# Patient Record
Sex: FEMALE | Race: WHITE | NOT HISPANIC OR LATINO
[De-identification: names, ages, dates, MRNs, and addresses within clinical notes are randomized per-mention and may not be internally consistent; named-entity substitution may affect disease eponyms.]

---

## 2019-07-11 ENCOUNTER — APPOINTMENT (OUTPATIENT)
Dept: FAMILY MEDICINE | Facility: CLINIC | Age: 29
End: 2019-07-11
Payer: COMMERCIAL

## 2019-07-11 ENCOUNTER — RECORD ABSTRACTING (OUTPATIENT)
Age: 29
End: 2019-07-11

## 2019-07-11 VITALS
OXYGEN SATURATION: 95 % | RESPIRATION RATE: 14 BRPM | SYSTOLIC BLOOD PRESSURE: 100 MMHG | HEART RATE: 108 BPM | DIASTOLIC BLOOD PRESSURE: 60 MMHG | HEIGHT: 59 IN | BODY MASS INDEX: 18.55 KG/M2 | WEIGHT: 92 LBS

## 2019-07-11 DIAGNOSIS — Z82.3 FAMILY HISTORY OF STROKE: ICD-10-CM

## 2019-07-11 DIAGNOSIS — Z82.49 FAMILY HISTORY OF ISCHEMIC HEART DISEASE AND OTHER DISEASES OF THE CIRCULATORY SYSTEM: ICD-10-CM

## 2019-07-11 DIAGNOSIS — R55 SYNCOPE AND COLLAPSE: ICD-10-CM

## 2019-07-11 PROBLEM — Z00.00 ENCOUNTER FOR PREVENTIVE HEALTH EXAMINATION: Status: ACTIVE | Noted: 2019-07-11

## 2019-07-11 PROCEDURE — 99213 OFFICE O/P EST LOW 20 MIN: CPT

## 2019-07-12 ENCOUNTER — APPOINTMENT (OUTPATIENT)
Dept: FAMILY MEDICINE | Facility: CLINIC | Age: 29
End: 2019-07-12

## 2019-07-12 NOTE — HISTORY OF PRESENT ILLNESS
[___ Weeks ago] : [unfilled] weeks ago [FreeTextEntry8] : Pt reports pain in left axilla x 1 week. Reports "lump" that is tender to touch, she notices pain as well when she reaches her arm up. Denies numbness, tingling, swelling, redness, skin changes, injury/trauma to the area.

## 2019-07-12 NOTE — ASSESSMENT
[FreeTextEntry1] : Check US \par Motrin or tylenol prn pain\par Light stretching encouraged \par May need PT

## 2019-07-12 NOTE — PHYSICAL EXAM
[No Lymphadenopathy] : no lymphadenopathy [Supple] : supple [No Axillary Lymphadenopathy] : no axillary lymphadenopathy [Normal] : affect was normal and insight and judgment were intact [de-identified] : L axilla: tender to touch, no swelling or skin changes. protruding tendon like structure noted to inspection and palpation.

## 2019-08-27 ENCOUNTER — APPOINTMENT (OUTPATIENT)
Dept: FAMILY MEDICINE | Facility: CLINIC | Age: 29
End: 2019-08-27
Payer: COMMERCIAL

## 2019-08-27 VITALS
DIASTOLIC BLOOD PRESSURE: 70 MMHG | SYSTOLIC BLOOD PRESSURE: 110 MMHG | TEMPERATURE: 103 F | OXYGEN SATURATION: 98 % | RESPIRATION RATE: 16 BRPM | HEART RATE: 110 BPM

## 2019-08-27 PROCEDURE — 99213 OFFICE O/P EST LOW 20 MIN: CPT

## 2019-08-27 NOTE — HISTORY OF PRESENT ILLNESS
[FreeTextEntry8] : pt c/o +cough +body aches +chills +temp x 2 days. Taking ibuprofen with some relief but fevers persistent. Denies sinus pressure, headaches, abdominal pain, nausea, vomiting, diarrhea, dysuria, shortness of breath, ear pain.

## 2019-08-27 NOTE — ASSESSMENT
[FreeTextEntry1] : Rest, plenty of fluids, flonase, vitamin C\par     - patient instructed to RTO if symptoms worsen or persist, if fevers develop, does not get better in 7 days.\par      - start abx, take with food. \par      -alternate tylenol and motrin for fever control \par      -bloodwork offered to pt, defers at this time.

## 2019-08-27 NOTE — PHYSICAL EXAM
[No Acute Distress] : no acute distress [Well Nourished] : well nourished [Well Developed] : well developed [Normal Sclera/Conjunctiva] : normal sclera/conjunctiva [No Respiratory Distress] : no respiratory distress  [Normal Outer Ear/Nose] : the outer ears and nose were normal in appearance [No Accessory Muscle Use] : no accessory muscle use [Clear to Auscultation] : lungs were clear to auscultation bilaterally [Normal Rate] : normal rate  [Regular Rhythm] : with a regular rhythm [No Murmur] : no murmur heard [Normal S1, S2] : normal S1 and S2 [Coordination Grossly Intact] : coordination grossly intact [No Focal Deficits] : no focal deficits [Normal Affect] : the affect was normal [Normal Gait] : normal gait [Alert and Oriented x3] : oriented to person, place, and time [Normal Insight/Judgement] : insight and judgment were intact [de-identified] : mild cervical adenopathy [de-identified] : bilateral nasal turbinates and posterior oropharynx erythematous, moderate clear post nasal drip

## 2019-08-31 ENCOUNTER — TRANSCRIPTION ENCOUNTER (OUTPATIENT)
Age: 29
End: 2019-08-31

## 2019-08-31 ENCOUNTER — APPOINTMENT (OUTPATIENT)
Dept: FAMILY MEDICINE | Facility: CLINIC | Age: 29
End: 2019-08-31
Payer: COMMERCIAL

## 2019-08-31 VITALS
OXYGEN SATURATION: 98 % | TEMPERATURE: 99.3 F | BODY MASS INDEX: 18.55 KG/M2 | HEART RATE: 89 BPM | SYSTOLIC BLOOD PRESSURE: 112 MMHG | WEIGHT: 92 LBS | DIASTOLIC BLOOD PRESSURE: 62 MMHG | HEIGHT: 59 IN | RESPIRATION RATE: 14 BRPM

## 2019-08-31 DIAGNOSIS — Z87.09 PERSONAL HISTORY OF OTHER DISEASES OF THE RESPIRATORY SYSTEM: ICD-10-CM

## 2019-08-31 PROCEDURE — 99213 OFFICE O/P EST LOW 20 MIN: CPT

## 2019-08-31 NOTE — PLAN
[FreeTextEntry1] : she was advised to continue current treatment and add Robitussin DM, rest and drink extra fluids and follow up in 3 days for any worsening symptoms.  She was also advised to see a GYN for a routine PAP or follow up here for a PAP

## 2019-08-31 NOTE — PHYSICAL EXAM
[No Acute Distress] : no acute distress [Well Nourished] : well nourished [Well Developed] : well developed [Well-Appearing] : well-appearing [Normal Voice/Communication] : normal voice/communication [Normal Sclera/Conjunctiva] : normal sclera/conjunctiva [Normal Outer Ear/Nose] : the outer ears and nose were normal in appearance [Normal Oropharynx] : the oropharynx was normal [Normal TMs] : both tympanic membranes were normal [No Lymphadenopathy] : no lymphadenopathy [Supple] : supple [No Respiratory Distress] : no respiratory distress  [Clear to Auscultation] : lungs were clear to auscultation bilaterally [No Accessory Muscle Use] : no accessory muscle use [Regular Rhythm] : with a regular rhythm [Normal Rate] : normal rate  [Normal S1, S2] : normal S1 and S2 [Speech Grossly Normal] : speech grossly normal [Memory Grossly Normal] : memory grossly normal [Normal Insight/Judgement] : insight and judgment were intact [Normal Mood] : the mood was normal

## 2019-08-31 NOTE — REVIEW OF SYSTEMS
[Fever] : fever [Chills] : chills [Fatigue] : fatigue [Sore Throat] : sore throat [Cough] : cough [Nausea] : nausea [Headache] : headache [Negative] : Heme/Lymph [Earache] : no earache [Nasal Discharge] : no nasal discharge [Postnasal Drip] : no postnasal drip [Shortness Of Breath] : no shortness of breath [Wheezing] : no wheezing

## 2019-08-31 NOTE — HISTORY OF PRESENT ILLNESS
[FreeTextEntry8] : pt c/o cough with phlegm, +ST,+fever  X 6 days.  Since tuesday her temp has been fluctuating from  degrees.  The cough is slowly improving but when she takes a deep breath she has to cough.  Sometimes the cough is productive and sometimes it is dry.  She has an irritated throat and a slight headache. She is taking the augmentin and she was taking tylenol but it gave her headaches and she took ibuprofen yesterday.

## 2019-09-25 ENCOUNTER — APPOINTMENT (OUTPATIENT)
Dept: FAMILY MEDICINE | Facility: CLINIC | Age: 29
End: 2019-09-25
Payer: COMMERCIAL

## 2019-09-25 VITALS
WEIGHT: 92 LBS | DIASTOLIC BLOOD PRESSURE: 68 MMHG | HEIGHT: 59 IN | OXYGEN SATURATION: 100 % | BODY MASS INDEX: 18.55 KG/M2 | RESPIRATION RATE: 14 BRPM | SYSTOLIC BLOOD PRESSURE: 102 MMHG | HEART RATE: 70 BPM

## 2019-09-25 DIAGNOSIS — Z86.19 PERSONAL HISTORY OF OTHER INFECTIOUS AND PARASITIC DISEASES: ICD-10-CM

## 2019-09-25 DIAGNOSIS — R09.82 POSTNASAL DRIP: ICD-10-CM

## 2019-09-25 PROCEDURE — 99213 OFFICE O/P EST LOW 20 MIN: CPT

## 2019-09-25 NOTE — ASSESSMENT
[FreeTextEntry1] : Start fluconazole once, repeat in 3 days if symptoms persist\par RTO if medication not helpful in symptom relief. \par Start probioitc\par Vitals and exam stable \par \par PND: start flonase

## 2019-09-25 NOTE — HISTORY OF PRESENT ILLNESS
[FreeTextEntry8] : pt c/o vaginal itchiness, + thick "clumpy" cream colored discharge X 5 days. Discharge has resolved over the past 2 days, itching has still persisted. Using vagisil wipes with  mild relief. Denies dysuria, blood in urine, abdominal pain. Pt also reports persistent dry cough, pnd, worse in the morning. Not taking anything for cough. Took mucinex and robatussin previously with little relief.

## 2019-09-25 NOTE — PHYSICAL EXAM
[Normal Sclera/Conjunctiva] : normal sclera/conjunctiva [Normal Oropharynx] : the oropharynx was normal [Normal Outer Ear/Nose] : the outer ears and nose were normal in appearance [Normal] : affect was normal and insight and judgment were intact [de-identified] : moderate thick PND [de-identified] : deferred

## 2020-02-18 ENCOUNTER — APPOINTMENT (OUTPATIENT)
Dept: FAMILY MEDICINE | Facility: CLINIC | Age: 30
End: 2020-02-18
Payer: COMMERCIAL

## 2020-02-18 VITALS
HEIGHT: 59 IN | TEMPERATURE: 98.5 F | HEART RATE: 73 BPM | SYSTOLIC BLOOD PRESSURE: 110 MMHG | OXYGEN SATURATION: 99 % | BODY MASS INDEX: 19.15 KG/M2 | WEIGHT: 95 LBS | DIASTOLIC BLOOD PRESSURE: 80 MMHG

## 2020-02-18 PROCEDURE — 99214 OFFICE O/P EST MOD 30 MIN: CPT

## 2020-02-18 RX ORDER — AMOXICILLIN AND CLAVULANATE POTASSIUM 400; 57 MG/5ML; MG/5ML
400-57 POWDER, FOR SUSPENSION ORAL
Qty: 200 | Refills: 0 | Status: DISCONTINUED | COMMUNITY
Start: 2019-08-27 | End: 2020-02-18

## 2020-02-18 RX ORDER — METHOCARBAMOL 500 MG/1
500 TABLET, FILM COATED ORAL
Qty: 20 | Refills: 0 | Status: DISCONTINUED | COMMUNITY
Start: 2020-02-18 | End: 2020-02-18

## 2020-02-18 RX ORDER — FLUCONAZOLE 40 MG/ML
40 POWDER, FOR SUSPENSION ORAL ONCE
Qty: 1 | Refills: 0 | Status: DISCONTINUED | COMMUNITY
Start: 2019-09-25 | End: 2020-02-18

## 2020-02-18 NOTE — PHYSICAL EXAM
[No Lymphadenopathy] : no lymphadenopathy [Supple] : supple [No Edema] : there was no peripheral edema [Normal] : soft, non-tender, non-distended, no masses palpated, no HSM and normal bowel sounds [de-identified] : bruising under right eye  [de-identified] : full rom but pain with extension and pain with palpation of lower cspine no nuchal rigidity noted  [de-identified] : mild pressure sensation on palpation of mid abdomen denies actual pain  [de-identified] : full rom of shoulders b/l without pain with flexion, extension , mild with lateral rotation  [de-identified] : Full ROM of back b/l, no pain w/ flexion, extension or lateral rotation, full strength b/l LE 5/5 [de-identified] : CN 2-12 INTACT, NORMAL STRENGTH UPPER AND LOWER EXT B/L 5/5 [de-identified] : bruising on right anterior upper arm and left medial aspect antecubital fossa region

## 2020-02-18 NOTE — REVIEW OF SYSTEMS
[Vision Problems] : no vision problems [Shortness Of Breath] : no shortness of breath [Chest Pain] : no chest pain [Nausea] : no nausea [Constipation] : no constipation [Abdominal Pain] : abdominal pain [Diarrhea] : diarrhea [Melena] : no melena [Vomiting] : no vomiting [Muscle Pain] : muscle pain [Back Pain] : back pain [Headache] : headache [FreeTextEntry4] : b/l tinnitus  [FreeTextEntry9] : +neck pain, +shoulder pain, +back pain
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
## 2020-05-15 ENCOUNTER — TRANSCRIPTION ENCOUNTER (OUTPATIENT)
Age: 30
End: 2020-05-15

## 2020-05-19 ENCOUNTER — TRANSCRIPTION ENCOUNTER (OUTPATIENT)
Age: 30
End: 2020-05-19

## 2020-12-21 PROBLEM — Z86.19 HISTORY OF CANDIDIASIS OF VAGINA: Status: RESOLVED | Noted: 2019-09-25 | Resolved: 2020-12-21

## 2020-12-21 PROBLEM — Z87.09 HISTORY OF ACUTE SINUSITIS: Status: RESOLVED | Noted: 2019-08-27 | Resolved: 2020-12-21

## 2020-12-29 ENCOUNTER — APPOINTMENT (OUTPATIENT)
Dept: FAMILY MEDICINE | Facility: CLINIC | Age: 30
End: 2020-12-29
Payer: COMMERCIAL

## 2020-12-29 VITALS
DIASTOLIC BLOOD PRESSURE: 70 MMHG | SYSTOLIC BLOOD PRESSURE: 110 MMHG | HEART RATE: 85 BPM | WEIGHT: 90 LBS | OXYGEN SATURATION: 98 % | HEIGHT: 59 IN | RESPIRATION RATE: 14 BRPM | BODY MASS INDEX: 18.14 KG/M2

## 2020-12-29 VITALS — TEMPERATURE: 98.2 F

## 2020-12-29 DIAGNOSIS — H69.83 OTHER SPECIFIED DISORDERS OF EUSTACHIAN TUBE, BILATERAL: ICD-10-CM

## 2020-12-29 DIAGNOSIS — M79.629 PAIN IN UNSPECIFIED UPPER ARM: ICD-10-CM

## 2020-12-29 DIAGNOSIS — Z87.39 PERSONAL HISTORY OF OTHER DISEASES OF THE MUSCULOSKELETAL SYSTEM AND CONNECTIVE TISSUE: ICD-10-CM

## 2020-12-29 DIAGNOSIS — Z86.69 PERSONAL HISTORY OF OTHER DISEASES OF THE NERVOUS SYSTEM AND SENSE ORGANS: ICD-10-CM

## 2020-12-29 DIAGNOSIS — Z87.898 PERSONAL HISTORY OF OTHER SPECIFIED CONDITIONS: ICD-10-CM

## 2020-12-29 DIAGNOSIS — Z87.42 PERSONAL HISTORY OF OTHER DISEASES OF THE FEMALE GENITAL TRACT: ICD-10-CM

## 2020-12-29 LAB — CYTOLOGY CVX/VAG DOC THIN PREP: NORMAL

## 2020-12-29 PROCEDURE — 99213 OFFICE O/P EST LOW 20 MIN: CPT

## 2020-12-29 PROCEDURE — 99072 ADDL SUPL MATRL&STAF TM PHE: CPT

## 2020-12-29 RX ORDER — CYCLOBENZAPRINE HYDROCHLORIDE 5 MG/1
5 TABLET, FILM COATED ORAL
Qty: 15 | Refills: 0 | Status: COMPLETED | COMMUNITY
Start: 2020-02-18 | End: 2020-12-29

## 2020-12-29 NOTE — ASSESSMENT
[FreeTextEntry1] : PND: start flonase and otc antihistamine daily \par If sx persist she is to see ENT\par pt agreeable to plan.

## 2020-12-29 NOTE — PHYSICAL EXAM
[Normal] : affect was normal and insight and judgment were intact [de-identified] : bilateral Tms mild serous effusion R>L

## 2020-12-29 NOTE — HISTORY OF PRESENT ILLNESS
[FreeTextEntry8] : pt c/o +right ear pressure +clogged x 2 days. Worse when getting up from laying position. Denies fevers, chills, ear pain, hearing difficulties. \par

## 2022-01-04 ENCOUNTER — APPOINTMENT (OUTPATIENT)
Dept: FAMILY MEDICINE | Facility: CLINIC | Age: 32
End: 2022-01-04
Payer: COMMERCIAL

## 2022-01-04 DIAGNOSIS — Z87.39 PERSONAL HISTORY OF OTHER DISEASES OF THE MUSCULOSKELETAL SYSTEM AND CONNECTIVE TISSUE: ICD-10-CM

## 2022-01-04 DIAGNOSIS — Z87.828 PERSONAL HISTORY OF OTHER (HEALED) PHYSICAL INJURY AND TRAUMA: ICD-10-CM

## 2022-01-04 PROCEDURE — 99212 OFFICE O/P EST SF 10 MIN: CPT | Mod: 95

## 2022-01-04 RX ORDER — FLUTICASONE PROPIONATE 50 UG/1
50 SPRAY, METERED NASAL
Qty: 1 | Refills: 0 | Status: DISCONTINUED | COMMUNITY
Start: 2020-12-29 | End: 2022-01-04

## 2022-01-04 NOTE — REVIEW OF SYSTEMS
[Fever] : no fever [Abdominal Pain] : abdominal pain [Nausea] : no nausea [Constipation] : no constipation [Diarrhea] : diarrhea [Vomiting] : no vomiting [Heartburn] : no heartburn [Melena] : no melena [Negative] : Heme/Lymph

## 2022-01-04 NOTE — PLAN
[FreeTextEntry1] : she was advised to follow a bland diet and was offered a rx for omeprazole but is unable to swallow pills, she was advised to take a OTC antacid every 3-4 hours as needed and to follow up for any worsening symptoms

## 2022-01-04 NOTE — HISTORY OF PRESENT ILLNESS
[Home] : at home, [unfilled] , at the time of the visit. [Medical Office: ([facility])___] : at the medical office located in  [Verbal consent obtained from patient] : the patient, [unfilled] [FreeTextEntry8] : After receiving verbal consent the visit was performed virtually via [employer] as the patient was unable to be seen in the office due to the COVID 19 pandemic.  On 1/1 she started with a tight feeling in her upper abdomen and did have pain after eating.  She has some burning in the upper abdomen.  The symptoms come and go.  She has had a normal appetite and is having normal bowel movements.  She hasn't had any nausea.  She took Tylenol on 1/1 but no other medications.  She denies eating anything unusual on 12/31, she was in [place] and returned home on 12/29\par

## 2022-07-01 ENCOUNTER — APPOINTMENT (OUTPATIENT)
Dept: FAMILY MEDICINE | Facility: CLINIC | Age: 32
End: 2022-07-01

## 2022-07-01 VITALS
HEIGHT: 59 IN | BODY MASS INDEX: 18.14 KG/M2 | WEIGHT: 90 LBS | RESPIRATION RATE: 14 BRPM | SYSTOLIC BLOOD PRESSURE: 100 MMHG | OXYGEN SATURATION: 99 % | HEART RATE: 72 BPM | DIASTOLIC BLOOD PRESSURE: 60 MMHG

## 2022-07-01 VITALS — TEMPERATURE: 98.4 F

## 2022-07-01 DIAGNOSIS — Z87.19 PERSONAL HISTORY OF OTHER DISEASES OF THE DIGESTIVE SYSTEM: ICD-10-CM

## 2022-07-01 DIAGNOSIS — M51.24 OTHER INTERVERTEBRAL DISC DISPLACEMENT, THORACIC REGION: ICD-10-CM

## 2022-07-01 DIAGNOSIS — M50.20 OTHER CERVICAL DISC DISPLACEMENT, UNSPECIFIED CERVICAL REGION: ICD-10-CM

## 2022-07-01 PROCEDURE — 99213 OFFICE O/P EST LOW 20 MIN: CPT

## 2022-07-01 NOTE — HISTORY OF PRESENT ILLNESS
[FreeTextEntry1] : patient presents to request massage therapist script  [de-identified] : She has a herniated disc in her neck and back and she has been going for massage therapy and it is helping and she needs a new rx.  She brought in copies of MRI reports for me to review

## 2022-07-01 NOTE — PHYSICAL EXAM
[No Acute Distress] : no acute distress [Well Nourished] : well nourished [Well Developed] : well developed [Normal Voice/Communication] : normal voice/communication [Well-Appearing] : well-appearing [Coordination Grossly Intact] : coordination grossly intact [Normal Mood] : the mood was normal [de-identified] : tenderness on palpation of left mid back paraspinal area

## 2023-12-18 NOTE — PLAN
[FreeTextEntry1] : rx was provided for medical massage and she may call for renewals when needed
Xray Chest 1 View- PORTABLE-Urgent

## 2025-01-30 ENCOUNTER — NON-APPOINTMENT (OUTPATIENT)
Age: 35
End: 2025-01-30

## 2025-01-30 ENCOUNTER — TRANSCRIPTION ENCOUNTER (OUTPATIENT)
Age: 35
End: 2025-01-30

## 2025-09-02 ENCOUNTER — NON-APPOINTMENT (OUTPATIENT)
Age: 35
End: 2025-09-02